# Patient Record
Sex: FEMALE | Race: WHITE | NOT HISPANIC OR LATINO | Employment: UNEMPLOYED
[De-identification: names, ages, dates, MRNs, and addresses within clinical notes are randomized per-mention and may not be internally consistent; named-entity substitution may affect disease eponyms.]

---

## 2021-03-31 PROBLEM — I49.8 BIGEMINY: Status: ACTIVE | Noted: 2021-03-31

## 2021-03-31 PROBLEM — R79.89 ELEVATED BRAIN NATRIURETIC PEPTIDE (BNP) LEVEL: Status: ACTIVE | Noted: 2021-03-31

## 2021-03-31 PROBLEM — Z96.642 S/P HIP REPLACEMENT, LEFT: Status: ACTIVE | Noted: 2021-03-31

## 2021-03-31 PROBLEM — R07.89 OTHER CHEST PAIN: Status: ACTIVE | Noted: 2021-03-31

## 2021-03-31 PROBLEM — M54.2 NECK PAIN: Status: ACTIVE | Noted: 2021-03-31

## 2021-03-31 PROBLEM — D50.9 MICROCYTIC ANEMIA: Status: ACTIVE | Noted: 2021-03-31

## 2021-03-31 PROBLEM — Z98.0 STATUS POST INTESTINAL BYPASS OR ANASTOMOSIS: Status: ACTIVE | Noted: 2021-03-31

## 2021-03-31 PROBLEM — Z91.09 ENVIRONMENTAL ALLERGIES: Status: ACTIVE | Noted: 2021-03-31

## 2021-03-31 PROBLEM — I10 ESSENTIAL HYPERTENSION: Status: ACTIVE | Noted: 2021-03-31

## 2021-03-31 PROBLEM — K80.20 CALCULUS OF GALLBLADDER WITHOUT CHOLECYSTITIS WITHOUT OBSTRUCTION: Status: ACTIVE | Noted: 2021-03-31

## 2021-03-31 PROBLEM — R74.01 TRANSAMINITIS: Status: ACTIVE | Noted: 2021-03-31

## 2021-03-31 PROBLEM — H93.19 TINNITUS: Status: ACTIVE | Noted: 2021-03-31

## 2021-03-31 PROBLEM — K50.919 CROHN'S DISEASE WITH COMPLICATION (HCC): Status: ACTIVE | Noted: 2021-03-31

## 2021-03-31 PROBLEM — H53.9 CHANGE IN VISION: Status: ACTIVE | Noted: 2021-03-31

## 2021-03-31 PROBLEM — E66.01 CLASS 3 SEVERE OBESITY WITH SERIOUS COMORBIDITY IN ADULT (HCC): Status: ACTIVE | Noted: 2021-03-31

## 2021-03-31 PROBLEM — Z90.49 HISTORY OF COLON RESECTION: Status: ACTIVE | Noted: 2021-03-31

## 2021-03-31 PROBLEM — K42.0 UMBILICAL HERNIA WITH OBSTRUCTION, WITHOUT GANGRENE: Status: ACTIVE | Noted: 2021-03-31

## 2021-05-01 ENCOUNTER — TELEMEDICINE (OUTPATIENT)
Dept: TELEHEALTH | Facility: TELEMEDICINE | Age: 66
End: 2021-05-01
Payer: MEDICARE

## 2021-05-01 DIAGNOSIS — R19.7 DIARRHEA, UNSPECIFIED TYPE: ICD-10-CM

## 2021-05-01 DIAGNOSIS — K50.919 CROHN'S DISEASE WITH COMPLICATION, UNSPECIFIED GASTROINTESTINAL TRACT LOCATION (HCC): ICD-10-CM

## 2021-05-01 DIAGNOSIS — R11.2 NAUSEA AND VOMITING, INTRACTABILITY OF VOMITING NOT SPECIFIED, UNSPECIFIED VOMITING TYPE: ICD-10-CM

## 2021-05-01 DIAGNOSIS — K80.20 CALCULUS OF GALLBLADDER WITHOUT CHOLECYSTITIS WITHOUT OBSTRUCTION: ICD-10-CM

## 2021-05-01 PROCEDURE — 99203 OFFICE O/P NEW LOW 30 MIN: CPT | Mod: 95,CR | Performed by: PHYSICIAN ASSISTANT

## 2021-05-01 NOTE — PROGRESS NOTES
Virtual Visit: New Patient   This visit was conducted via Zoom using secure and encrypted videoconferencing technology. The patient was in a private location in the [facility].    The patient's identity was confirmed and verbal consent was obtained for this virtual visit.    Subjective:     CC: Diarrhea and abdominal discomfort x3 days.    Hayley Ramirez is a 65 y.o. female presenting to establish care and to discuss the evaluation and management of:    This is a very pleasant 65-year-old female seen via virtual visit for abdominal discomfort and diarrhea x3 days.  Patient has a past medical history significant for complicated Crohn's disease, bowel resection and cholelithiasis.  She was recently seen and treated in the emergency department 1 month ago for similar issues.  Patient is concerned because she drove down to [place] and when she got down there symptoms started.  3 days ago she felt feverish and chilled.  She had multiple episodes of vomiting and diarrhea.  Her nausea and vomiting have since resolved.  She is still having multiple episodes of diarrhea daily.  Denies any blood or mucus in her stool.  Currently denies any abdominal pain, fevers, chills or myalgias.  When she was seen in the emergency department she had multiple gallstones present however there is no signs of obstruction or infection.  She is scheduled for a follow-up colonoscopy in the next month with her gastroenterologist in [place].    ROS    Constitutional: Positive for fevers and chills 2 days ago this is since resolved.  HENT: Negative for congestion.    Eyes: Negative for pain.   Respiratory: Negative for cough and shortness of breath.    Cardiovascular: Negative for leg swelling.   Gastrointestinal: Positive for diarrhea, nausea and vomiting.  Genitourinary: Negative for dysuria and hematuria.   Skin: Negative for rash.   Neurological: Negative for dizziness, focal weakness and headaches.   Endo/Heme/Allergies: Does not  bruise/bleed easily.   Psychiatric/Behavioral: Negative for depression.  The patient is not nervous/anxious.      Allergies   Allergen Reactions   • Iodine      Unknown, pt unsure, states she has had CT contrast in the past w/out reactions    • Sulfa Drugs        Current medicines (including changes today)  Current Outpatient Medications   Medication Sig Dispense Refill   • losartan (COZAAR) 50 MG Tab Take 1 tablet by mouth every day. 30 tablet 5   • ondansetron (ZOFRAN ODT) 4 MG TABLET DISPERSIBLE Take 1 tablet by mouth every 8 hours as needed for Nausea. 10 tablet 0     No current facility-administered medications for this visit.       She  has a past medical history of Asthma, Crohn's colitis (HCC), and Ulcerative colitis (HCC).  She  has a past surgical history that includes hip replacement, partial.      Family History   Problem Relation Age of Onset   • Stroke Mother    • Diabetes Mother    • Heart Disease Father      Family Status   Relation Name Status   • Mo   at age 83   • Fa   at age 78       Patient Active Problem List    Diagnosis Date Noted   • Environmental allergies 2021   • Crohn's disease with complication ([facility]) 2021   • Essential hypertension 2021   • Class 3 severe obesity with serious comorbidity in adult ([facility]) 2021   • S/P hip replacement, left 2021   • History of colon resection iliosigmoid 2016 anastomosis 2021   • Neck pain 2021   • Other chest pain 2021   • Tinnitus 2021   • Change in vision 2021   • Transaminitis 2021   • Elevated brain natriuretic peptide (BNP) level 2021   • Microcytic anemia 2021   • Calculus of gallbladder without cholecystitis without obstruction 2021   • Umbilical hernia with obstruction, without gangrene 2021   • Status post intestinal bypass or anastomosis 2021   • Bigeminy 2021          Objective:   There were no vitals taken for this  visit.    Physical Exam:  Constitutional: Alert, no distress, well-groomed.  Skin: No rashes in visible areas.  Eye: Round. Conjunctiva clear, lids normal. No icterus.   ENMT: Lips pink without lesions, good dentition, moist mucous membranes. Phonation normal.  Neck: No masses, no thyromegaly. Moves freely without pain.  Respiratory: Unlabored respiratory effort, no cough or audible wheeze  Psych: Alert and oriented x3, normal affect and mood.       Assessment and Plan:   The following treatment plan was discussed:     1. Diarrhea, unspecified type    2. Nausea and vomiting, intractability of vomiting not specified, unspecified vomiting type    3. Crohn's disease with complication, unspecified gastrointestinal tract location (HCC)    4. Calculus of gallbladder without cholecystitis without obstruction      Differential diagnoses and treatment options were discussed with patient at length today.  Patient has a complex medical history significant for bowel resection, complicated Crohn's disease and cholelithiasis.  She has had progressively worsening symptoms over the last 3 days.  Currently she is in [place].  Over the past 2 to 3 days she has felt feverish and chilled.  Multiple episodes of vomiting and diarrhea.  She feels slightly depleted.  At this time without being able to thoroughly evaluate her in person it is hard to definitively say where her symptoms are coming from.  I recommend she present to a clinic in [place] to be evaluated in person at this time.  The patient agreed to this plan.  Encouraged her to call with questions or concerns.    Differential diagnosis, natural history, supportive care, and indications for immediate follow-up discussed at length.         Follow-up: No follow-ups on file.

## 2023-02-23 PROBLEM — E66.01 CLASS 3 SEVERE OBESITY WITH SERIOUS COMORBIDITY IN ADULT (HCC): Chronic | Status: ACTIVE | Noted: 2021-03-31

## 2023-02-23 PROBLEM — I10 ESSENTIAL HYPERTENSION: Chronic | Status: ACTIVE | Noted: 2021-03-31

## 2023-02-23 PROBLEM — R79.89 ELEVATED BRAIN NATRIURETIC PEPTIDE (BNP) LEVEL: Chronic | Status: ACTIVE | Noted: 2021-03-31

## 2023-03-08 PROBLEM — H93.19 TINNITUS: Status: RESOLVED | Noted: 2021-03-31 | Resolved: 2023-03-08

## 2023-03-08 PROBLEM — R74.01 TRANSAMINITIS: Status: RESOLVED | Noted: 2021-03-31 | Resolved: 2023-03-08

## 2023-03-08 PROBLEM — R76.8 POSITIVE ANA (ANTINUCLEAR ANTIBODY): Status: ACTIVE | Noted: 2023-03-08

## 2023-03-08 PROBLEM — R94.6 ABNORMAL THYROID EXAM: Status: ACTIVE | Noted: 2023-03-08

## 2023-03-08 PROBLEM — K50.919 CROHN'S DISEASE WITH COMPLICATION (HCC): Chronic | Status: ACTIVE | Noted: 2021-03-31

## 2023-03-08 PROBLEM — M54.2 NECK PAIN: Status: RESOLVED | Noted: 2021-03-31 | Resolved: 2023-03-08

## 2023-03-08 PROBLEM — D50.0 IRON DEFICIENCY ANEMIA DUE TO CHRONIC BLOOD LOSS: Status: ACTIVE | Noted: 2023-03-08

## 2023-03-08 PROBLEM — R07.89 OTHER CHEST PAIN: Status: RESOLVED | Noted: 2021-03-31 | Resolved: 2023-03-08

## 2023-03-08 PROBLEM — Z90.49 HISTORY OF COLON RESECTION: Chronic | Status: ACTIVE | Noted: 2021-03-31

## 2023-03-08 PROBLEM — K22.70 BARRETT'S ESOPHAGUS WITHOUT DYSPLASIA: Status: ACTIVE | Noted: 2023-03-08

## 2023-03-08 PROBLEM — H53.9 CHANGE IN VISION: Status: RESOLVED | Noted: 2021-03-31 | Resolved: 2023-03-08

## 2024-02-13 PROBLEM — K22.70 BARRETT'S ESOPHAGUS WITHOUT DYSPLASIA: Chronic | Status: ACTIVE | Noted: 2023-03-08

## 2024-02-28 PROBLEM — R76.8 POSITIVE ANA (ANTINUCLEAR ANTIBODY): Chronic | Status: ACTIVE | Noted: 2023-03-08

## 2024-02-28 PROBLEM — Z98.0 STATUS POST INTESTINAL BYPASS OR ANASTOMOSIS: Chronic | Status: ACTIVE | Noted: 2021-03-31

## 2024-02-28 PROBLEM — D50.0 IRON DEFICIENCY ANEMIA DUE TO CHRONIC BLOOD LOSS: Chronic | Status: ACTIVE | Noted: 2023-03-08

## 2024-02-28 PROBLEM — D50.9 MICROCYTIC ANEMIA: Chronic | Status: ACTIVE | Noted: 2021-03-31

## 2024-02-28 PROBLEM — J45.40 MODERATE PERSISTENT ASTHMA WITHOUT COMPLICATION: Status: ACTIVE | Noted: 2024-02-28

## 2024-02-28 PROBLEM — Z96.642 S/P HIP REPLACEMENT, LEFT: Chronic | Status: ACTIVE | Noted: 2021-03-31

## 2024-07-10 ENCOUNTER — RESEARCH ENCOUNTER (OUTPATIENT)
Dept: RESEARCH | Facility: MEDICAL CENTER | Age: 69
End: 2024-07-10